# Patient Record
Sex: MALE | Race: WHITE | NOT HISPANIC OR LATINO | Employment: OTHER | URBAN - METROPOLITAN AREA
[De-identification: names, ages, dates, MRNs, and addresses within clinical notes are randomized per-mention and may not be internally consistent; named-entity substitution may affect disease eponyms.]

---

## 2020-11-04 ENCOUNTER — OFFICE VISIT (OUTPATIENT)
Dept: NEUROSURGERY | Facility: CLINIC | Age: 65
End: 2020-11-04

## 2020-11-04 VITALS
TEMPERATURE: 98.7 F | DIASTOLIC BLOOD PRESSURE: 78 MMHG | OXYGEN SATURATION: 98 % | WEIGHT: 152 LBS | HEIGHT: 71 IN | SYSTOLIC BLOOD PRESSURE: 146 MMHG | BODY MASS INDEX: 21.28 KG/M2 | HEART RATE: 103 BPM

## 2020-11-04 DIAGNOSIS — G93.89 CEREBRAL VENTRICULOMEGALY: Primary | ICD-10-CM

## 2020-11-04 DIAGNOSIS — R26.89 SHUFFLING GAIT: ICD-10-CM

## 2020-11-04 PROCEDURE — 99203 OFFICE O/P NEW LOW 30 MIN: CPT | Performed by: NEUROLOGICAL SURGERY

## 2020-11-04 RX ORDER — LEVOTHYROXINE SODIUM 0.03 MG/1
1 TABLET ORAL DAILY
COMMUNITY
Start: 2020-10-06

## 2020-11-04 RX ORDER — POLYETHYLENE GLYCOL 3350 17 G/17G
POWDER, FOR SOLUTION ORAL AS NEEDED
COMMUNITY
Start: 2020-10-26

## 2020-11-04 RX ORDER — PSYLLIUM HUSK 0.4 G
1000 CAPSULE ORAL DAILY
COMMUNITY

## 2020-11-04 RX ORDER — NIACIN 1000 MG/1
1000 TABLET, EXTENDED RELEASE ORAL 2 TIMES DAILY
COMMUNITY

## 2020-11-04 RX ORDER — BILBERRY FRUIT 1000 MG
1 CAPSULE ORAL 2 TIMES DAILY
COMMUNITY

## 2020-11-04 RX ORDER — GLYBURIDE 5 MG/1
5 TABLET ORAL DAILY
COMMUNITY

## 2020-11-04 RX ORDER — CHLORAL HYDRATE 500 MG
1 CAPSULE ORAL 2 TIMES DAILY
COMMUNITY

## 2020-11-04 RX ORDER — ASCORBIC ACID 1000 MG
1 TABLET ORAL DAILY
COMMUNITY

## 2020-11-04 NOTE — PROGRESS NOTES
Subjective   CC-shuffling gait    History of Present Illness: Trevor Cardenas is a 65 y.o. male is being seen for consultation today at the request of Valentina Santiago for NPH. Today Mr. Cardenas reports overall that he feels good. He reports some muscle strength weakness in BUE and BLE that has worsened over the last couple of years.  Reports that he has had a shuffling gait for approximately 2 years however is gotten worse over the past 2 months.  He says though now that he is paid attention to it he is able to overcome it and it is not a significant problem.  He denies any significant numbness or weakness.  Denies any recent falls.  Denies any difficulty with daily activities.  Denies any urinary incontinence.  Denies any confusion or memory problems.  He reports that he only noticed the shuffling gait because of a friend.     History of Present Illness    The following portions of the patient's history were reviewed and updated as appropriate: allergies, past family history, past medical history, past social history, past surgical history and problem list.    Past Medical History:   Diagnosis Date   • Diabetes mellitus (CMS/Formerly [facility])     Type II        History reviewed. No pertinent surgical history.       Current Outpatient Medications:   •  Cholecalciferol (Vitamin D-1000 Max St) 25 MCG (1000 UT) tablet, Take 1,000 Units by mouth Daily., Disp: , Rfl:   •  Coenzyme Q10 (Co Q 10) 10 MG capsule, Take 1 capsule by mouth Daily., Disp: , Rfl:   •  glyburide (DIAbeta) 5 MG tablet, Take 5 mg by mouth Daily., Disp: , Rfl:   •  levothyroxine (SYNTHROID, LEVOTHROID) 25 MCG tablet, Take 1 tablet by mouth Daily., Disp: , Rfl:   •  metFORMIN (GLUCOPHAGE) 1000 MG tablet, Take 1,000 mg by mouth 2 (two) times a day., Disp: , Rfl:   •  niacin (NIASPAN) 1000 MG CR tablet, Take 1,000 mg by mouth 2 (two) times a day., Disp: , Rfl:   •  Omega-3 Fatty Acids (fish oil) 1000 MG capsule capsule, Take 1 capsule by mouth 2 (two) times a  "day., Disp: , Rfl:   •  polyethylene glycol (MIRALAX) 17 GM/SCOOP powder, As Needed., Disp: , Rfl:   •  Saw Palmetto 1000 MG capsule, Take 1 capsule by mouth 2 (two) times a day., Disp: , Rfl:   •  Vitamin A 3 MG (80113 UT) capsule, Take 10,000 Units by mouth Daily., Disp: , Rfl:      Social History     Socioeconomic History   • Marital status: Unknown     Spouse name: Not on file   • Number of children: Not on file   • Years of education: Not on file   • Highest education level: Not on file   Tobacco Use   • Smoking status: Former Smoker     Packs/day: 2.00     Years: 6.00     Pack years: 12.00     Types: Cigarettes     Quit date: 1976     Years since quittin.8   • Smokeless tobacco: Never Used   Substance and Sexual Activity   • Alcohol use: Yes     Alcohol/week: 3.0 - 4.0 standard drinks     Types: 3 - 4 Cans of beer per week   • Drug use: Never   • Sexual activity: Defer        Family History   Problem Relation Age of Onset   • No Known Problems Mother    • No Known Problems Father       No Known Allergies     Review of Systems   Constitutional: Negative for chills and fever.   HENT: Positive for tinnitus (x 30 yrs).    Eyes: Negative for redness and visual disturbance.   Respiratory: Negative for cough and shortness of breath.    Cardiovascular: Positive for palpitations. Negative for chest pain and leg swelling.   Genitourinary: Negative for difficulty urinating and frequency.   Musculoskeletal: Negative for back pain, gait problem, neck pain and neck stiffness.   Neurological: Positive for weakness (strength in BUE and BLE has diminished over the last couple of years). Negative for dizziness, speech difficulty, light-headedness, numbness and headaches.   Psychiatric/Behavioral: Negative for confusion and decreased concentration.       Objective     Vitals:    20 1429   BP: 146/78   Pulse: 103   Temp: 98.7 °F (37.1 °C)   SpO2: 98%   Weight: 68.9 kg (152 lb)   Height: 179.1 cm (70.5\")     Body " mass index is 21.5 kg/m².      Physical Exam  Neurologic Exam  Awake, alert, oriented x3  Pupils equal round reactive to light  Extraocular muscles intact  Face symmetric  Speech is fluent and clear  No pronator drift  Motor exam  Bilateral deltoids 5/5, bilateral biceps 5/5, bilateral triceps 5/5, bilateral wrist extension 5/5 bilateral hand  5/5  Bilateral hip flexion 5/5, bilateral knee extension 5/5, bilateral DF/PF 5/5  No clonus  No Shalom's reflex  Steady normal gait  Able to walk heel-to-toe without difficulty  Able to detect  light touch in all 4 extremities  No pronator drift  No resting tremor  Cogwheel rigidity    Assessment/Plan   Independent Review of Radiographic Studies:      I personally reviewed the images from the following studies.  MRI brain with and without contrast from 2020  The MRI images demonstrate ventriculomegaly without significant transependymal edema.  There is no intracranial lesions or mass-effect.    Medical Decision Makin-year-old male with 1 to 2-year history of shuffling gait  -Reports that the shuffling gait was pointed out by friend and now that he is aware of it he is able to overcome it and it does not cause significant problems.  He had an MRI which showed ventriculomegaly.  Was referred to me for possible NPH.  He denies any memory problems or confusion.  He denies any urinary incontinence  -I recommended that he follow-up with a neurologist for further evaluation of NPH versus other neurologic conditions which can cause a shuffling gait such as Parkinson's  -He reports that his symptoms are not that bad and he does not want a follow-up with a neurologist at this time.  I explained to him the symptoms that he could expect to see with NPH and if they progress that he should call back and should finish the work-up.  I also recommended a possible repeat MRI in the future however he declined this idea prefers to call back as needed.     Diagnoses  and all orders for this visit:    1. Cerebral ventriculomegaly (Primary)    2. Shuffling gait      Return if symptoms worsen or fail to improve.